# Patient Record
Sex: FEMALE | Race: WHITE | NOT HISPANIC OR LATINO
[De-identification: names, ages, dates, MRNs, and addresses within clinical notes are randomized per-mention and may not be internally consistent; named-entity substitution may affect disease eponyms.]

---
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
## 2024-03-08 ENCOUNTER — TRANSCRIPTION ENCOUNTER (OUTPATIENT)
Age: 24
End: 2024-03-08

## 2024-03-08 ENCOUNTER — EMERGENCY (EMERGENCY)
Facility: HOSPITAL | Age: 24
LOS: 1 days | Discharge: HOME | End: 2024-03-08
Attending: EMERGENCY MEDICINE
Payer: COMMERCIAL

## 2024-03-08 VITALS
OXYGEN SATURATION: 99 % | HEART RATE: 72 BPM | SYSTOLIC BLOOD PRESSURE: 108 MMHG | DIASTOLIC BLOOD PRESSURE: 77 MMHG | RESPIRATION RATE: 16 BRPM | TEMPERATURE: 98 F

## 2024-03-08 VITALS
WEIGHT: 154.98 LBS | OXYGEN SATURATION: 100 % | HEIGHT: 66 IN | HEART RATE: 106 BPM | DIASTOLIC BLOOD PRESSURE: 74 MMHG | SYSTOLIC BLOOD PRESSURE: 126 MMHG | RESPIRATION RATE: 18 BRPM | TEMPERATURE: 99 F

## 2024-03-08 PROCEDURE — 99284 EMERGENCY DEPT VISIT MOD MDM: CPT

## 2024-03-08 PROCEDURE — 99283 EMERGENCY DEPT VISIT LOW MDM: CPT

## 2024-03-08 RX ORDER — METHOCARBAMOL 500 MG/1
2 TABLET, FILM COATED ORAL
Qty: 20 | Refills: 0
Start: 2024-03-08 | End: 2024-03-12

## 2024-03-08 RX ORDER — METHOCARBAMOL 500 MG/1
750 TABLET, FILM COATED ORAL ONCE
Refills: 0 | Status: COMPLETED | OUTPATIENT
Start: 2024-03-08 | End: 2024-03-08

## 2024-03-08 RX ADMIN — METHOCARBAMOL 750 MILLIGRAM(S): 500 TABLET, FILM COATED ORAL at 21:08
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
## 2024-03-08 NOTE — ED ADULT NURSE NOTE - NSFALLUNIVINTERV_ED_ALL_ED
Bed/Stretcher in lowest position, wheels locked, appropriate side rails in place/Call bell, personal items and telephone in reach/Instruct patient to call for assistance before getting out of bed/chair/stretcher/Non-slip footwear applied when patient is off stretcher/[place] to call system/Physically safe environment - no spills, clutter or unnecessary equipment/Purposeful proactive rounding/Room/bathroom lighting operational, light cord in reach
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
## 2024-03-08 NOTE — ED PROVIDER NOTE - NSFOLLOWUPINSTRUCTIONS_ED_ALL_ED_FT
[facility]  [phone]    Concussions are common from head injuries.  A concussion is similar to a bruise.  A concussion is a clinical diagnosis (based on your exam and history).  CT scans are not necessary to diagnose a concussion.  Having a headache, mild confusion, mild nausea, fatigue and even mild dizziness are all consistent with a concussion.  It is important to allow your brain to fully heal in order to reduce the likelihood of ongoing concussion symptoms.  Risk factors for prolonged headache, confusion, dizziness include repeat concussions and returning to normal activity before the resolution of this concussion.      Please AVOID  Loud noises and bright lights.  Reading or the use of screens (ie. phones, videogames, TV)  Driving  Alcohol or excessive caffeine (you may continue to drink the usual amount of caffeine that you would)    Please DO  Rest, relax, sleep as you are able.  Increase hydration to avoid any dehydration which will exacerbate your symptoms.  Take tylenol up to 650 mg by mouth and motrin 600 mg by mouth with food and water alternating every 3 hours (ie. take tylenol then 3 hours later motrin then 3 hours later tylenol and so on) for ongoing symptom improvement.     You may return to work or school as usual once symptoms are completely resolved.  Do not return until such time as your symptoms are completely resolved as this will worsen your symptoms and increase risk of prolonged course.    If you experience any new or worsening symptoms, such as loss of consciousness, seizures, slurred speech, persistent vomiting, weakness or numbness in extremities, or worsening headache, seek medical attention immediately.

## 2024-03-08 NOTE — ED PROVIDER NOTE - PATIENT PORTAL LINK FT
You can access the FollowMyHealth Patient Portal offered by [facility] by registering at the following website: [email or web address]. By joining [facility]’s FollowMyHealth portal, you will also be able to view your health information using other applications (apps) compatible with our system.
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
## 2024-03-08 NOTE — ED PROVIDER NOTE - PHYSICAL EXAMINATION
General: Alert and Orientated x 3. No apparent distress.  Head: Normocephalic and atraumatic.  Eyes: PERRLA with EOMI.   ENT: MMM, Oropharynx clear  Neck: Supple. Trachea midline.   Cardiac: Normal S1 and S2 w/ RRR. No murmurs appreciated.   Pulmonary: CTA bilaterally. No increased WOB.   Abdominal: Soft, non-tender, non-distended  Neurologic: Patient with cranial nerves intact, equal strength bilaterally, sensation equal bilaterally, no facial droop, clear and coherent speech, intact finger to nose, negative pronator drift.  Musculoskeletal: No limited ROM.  Skin: Color appropriate for race. Intact, warm, and well-perfused.  Psychiatric: Appropriate mood and affect. No apparent risk to self or others. General: Alert and Orientated x 3. No apparent distress.  Head: Normocephalic and atraumatic.  Eyes: PERRLA with EOMI.   ENT: MMM  Neck: Supple.   Cardiac: No chest wall tenderness  Pulmonary: CTA bilaterally. No increased WOB.   Abdominal: Soft, non-tender, non-distended  Neurologic: Patient with cranial nerves intact, equal strength bilaterally, sensation equal bilaterally, no facial droop, clear and coherent speech, intact finger to nose, negative pronator drift.  Musculoskeletal: No limited ROM.  Skin: Color appropriate for race. Intact, warm, and well-perfused.  Psychiatric: Appropriate mood and affect. No apparent risk to self or others.

## 2024-03-08 NOTE — ED ADULT NURSE NOTE - CHPI ED NUR SYMPTOMS NEG
no acting out behaviors/no decreased eating/drinking/no difficulty bearing weight/no headache/no laceration
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
## 2024-09-24 ENCOUNTER — APPOINTMENT (OUTPATIENT)
Dept: NEUROLOGY | Facility: CLINIC | Age: 24
End: 2024-09-24
Payer: COMMERCIAL

## 2024-09-24 VITALS
HEIGHT: 66 IN | DIASTOLIC BLOOD PRESSURE: 78 MMHG | WEIGHT: 165 LBS | HEART RATE: 87 BPM | BODY MASS INDEX: 26.52 KG/M2 | SYSTOLIC BLOOD PRESSURE: 120 MMHG

## 2024-09-24 DIAGNOSIS — R41.840 ATTENTION AND CONCENTRATION DEFICIT: ICD-10-CM

## 2024-09-24 DIAGNOSIS — Z78.9 OTHER SPECIFIED HEALTH STATUS: ICD-10-CM

## 2024-09-24 PROBLEM — Z00.00 ENCOUNTER FOR PREVENTIVE HEALTH EXAMINATION: Status: ACTIVE | Noted: 2024-09-24

## 2024-09-24 PROCEDURE — 99204 OFFICE O/P NEW MOD 45 MIN: CPT | Mod: 25

## 2024-09-24 PROCEDURE — 95819 EEG AWAKE AND ASLEEP: CPT

## 2024-09-24 PROCEDURE — 93040 RHYTHM ECG WITH REPORT: CPT

## 2024-09-24 NOTE — PHYSICAL EXAM
[General Appearance - Alert] : alert [General Appearance - In No Acute Distress] : in no acute distress [General Appearance - Well-Appearing] : healthy appearing [Oriented To Time, Place, And Person] : oriented to person, place, and time [Impaired Insight] : insight and judgment were intact [Affect] : the affect was normal [Memory Recent] : recent memory was not impaired [Person] : oriented to person [Place] : oriented to place [Time] : oriented to time [Concentration Intact] : normal concentrating ability [Visual Intact] : visual attention was ~T not ~L decreased [Fluency] : fluency intact [Comprehension] : comprehension intact [Past History] : adequate knowledge of personal past history [Cranial Nerves Optic (II)] : visual acuity intact bilaterally,  visual fields full to confrontation, pupils equal round and reactive to light [Cranial Nerves Oculomotor (III)] : extraocular motion intact [Cranial Nerves Trigeminal (V)] : facial sensation intact symmetrically [Cranial Nerves Facial (VII)] : face symmetrical [Cranial Nerves Vestibulocochlear (VIII)] : hearing was intact bilaterally [Cranial Nerves Glossopharyngeal (IX)] : tongue and palate midline [Cranial Nerves Accessory (XI - Cranial And Spinal)] : head turning and shoulder shrug symmetric [Cranial Nerves Hypoglossal (XII)] : there was no tongue deviation with protrusion [Motor Tone] : muscle tone was normal in all four extremities [Motor Strength] : muscle strength was normal in all four extremities [No Muscle Atrophy] : normal bulk in all four extremities [Paresis Pronator Drift Right-Sided] : no pronator drift on the right [Paresis Pronator Drift Left-Sided] : no pronator drift on the left [Sensation Tactile Decrease] : light touch was intact [Sensation Pain / Temperature Decrease] : pain and temperature was intact [Romberg's Sign] : Romberg's sign was negtive [Abnormal Walk] : normal gait [Balance] : balance was intact [Past-pointing] : there was no past-pointing [Tremor] : no tremor present [Coordination - Dysmetria Impaired Finger-to-Nose Bilateral] : not present [2+] : Patella left 2+ [PERRL With Normal Accommodation] : pupils were equal in size, round, reactive to light, with normal accommodation [Extraocular Movements] : extraocular movements were intact [Full Visual Field] : full visual field

## 2024-09-24 NOTE — ASSESSMENT
[FreeTextEntry1] : Trouble focusing and zoning out at times Possible ADD or ADHD Would consider absence type of seizures  Will check an EEG and obtain formal neuropsychological evaluation  Further discussions to follow

## 2024-09-24 NOTE — HISTORY OF PRESENT ILLNESS
[FreeTextEntry1] : This 23-year-old woman was seen in neurological consultation today. She reports trouble focusing and concentrating and zoning out.  Has been going on at least a few years. 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